# Patient Record
Sex: FEMALE | Race: OTHER | URBAN - METROPOLITAN AREA
[De-identification: names, ages, dates, MRNs, and addresses within clinical notes are randomized per-mention and may not be internally consistent; named-entity substitution may affect disease eponyms.]

---
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
## 2020-11-10 NOTE — HPI: PIMPLES (ACNE)
[facility]    Dr. Wilber Kelley, DO     Your procedure is scheduled for Monday November 30th at approximately 10:20a.m. Please arrive to the outpatient surgery center at 9:50a.m..  The Pre-Surgical staff at [facility] will review your health history, perform an assessment and answer any questions you have.    PROCEDURE INSTRUCTIONS  1. Do not eat any solid food or drink liquids for at least 6 hours prior to your procedure.    2.Stop the following medication:  none  3. If you are going home following your procedure, you CANNOT drive after surgery. Make arrangements for transportation home.    4. Notify your doctor if you develop signs of illness such as fever, or cold/flu symptoms.    5. Wear comfortable clothing that is also appropriate for discharge.    6. Day of Procedure please take any Blood Pressure Medication and/or      Pain Medication with a small sip of water.        
How Severe Is Your Acne?: mild
Is This A New Presentation, Or A Follow-Up?: Acne

## 2023-03-26 NOTE — PROCEDURE: ACNE SURGERY
Yes
Detail Level: Detailed
Prep Text (Optional): Prior to removal the treatment areas were prepped in the usual fashion.
Acne Type: Comedonal Lesions
Extraction Method: 18 gauge needle
Consent was obtained and risks were reviewed including but not limited to scarring, infection, bleeding, scabbing, incomplete removal, and allergy to anesthesia.
Post-Care Instructions: I reviewed with the patient in detail post-care instructions. Patient is to keep the treatment areas dry overnight, and then apply bacitracin twice daily until healed. Patient may apply hydrogen peroxide soaks to remove any crusting.
Render Post-Care Instructions In Note?: no